# Patient Record
Sex: FEMALE | Race: WHITE | NOT HISPANIC OR LATINO | Employment: OTHER | URBAN - METROPOLITAN AREA
[De-identification: names, ages, dates, MRNs, and addresses within clinical notes are randomized per-mention and may not be internally consistent; named-entity substitution may affect disease eponyms.]

---

## 2021-08-06 ENCOUNTER — PREPPED CHART (OUTPATIENT)
Dept: URBAN - METROPOLITAN AREA CLINIC 19 | Facility: CLINIC | Age: 71
End: 2021-08-06

## 2022-01-14 ASSESSMENT — TONOMETRY
OD_IOP_MMHG: 10
OS_IOP_MMHG: 16

## 2022-01-14 ASSESSMENT — VISUAL ACUITY
OD_SC: 20/25-1
OS_SC: 20/25-1
OU_SC: J1

## 2022-01-18 ENCOUNTER — COMPREHENSIVE EXAM (OUTPATIENT)
Dept: URBAN - METROPOLITAN AREA CLINIC 19 | Facility: CLINIC | Age: 72
End: 2022-01-18

## 2022-01-18 DIAGNOSIS — H43.393: ICD-10-CM

## 2022-01-18 DIAGNOSIS — H04.123: ICD-10-CM

## 2022-01-18 DIAGNOSIS — H25.9: ICD-10-CM

## 2022-01-18 DIAGNOSIS — H35.371: ICD-10-CM

## 2022-01-18 DIAGNOSIS — H40.053: ICD-10-CM

## 2022-01-18 PROCEDURE — 92133 CPTRZD OPH DX IMG PST SGM ON: CPT

## 2022-01-18 PROCEDURE — 92014 COMPRE OPH EXAM EST PT 1/>: CPT

## 2022-01-18 ASSESSMENT — KERATOMETRY
OD_AXISANGLE2_DEGREES: 34
OS_AXISANGLE_DEGREES: 170
OS_K1POWER_DIOPTERS: 44.00
OS_AXISANGLE2_DEGREES: 80
OD_AXISANGLE_DEGREES: 124
OD_K1POWER_DIOPTERS: 43.50
OS_K2POWER_DIOPTERS: 45.00
OD_K2POWER_DIOPTERS: 43.75

## 2022-01-18 ASSESSMENT — TONOMETRY
OD_IOP_MMHG: 23
OS_IOP_MMHG: 21
OS_IOP_MMHG: 20
OD_IOP_MMHG: 18
OD_IOP_MMHG: 22
OS_IOP_MMHG: 20

## 2022-01-18 ASSESSMENT — VISUAL ACUITY
OD_CC: 20/20
OS_SC: 20/25
OD_SC: 20/25-1
OD_OTHER: +2.50 OTC READERS

## 2022-05-18 ENCOUNTER — ESTABLISHED PATIENT (OUTPATIENT)
Dept: URBAN - METROPOLITAN AREA CLINIC 19 | Facility: CLINIC | Age: 72
End: 2022-05-18

## 2022-05-18 DIAGNOSIS — H40.053: ICD-10-CM

## 2022-05-18 PROCEDURE — 99213 OFFICE O/P EST LOW 20 MIN: CPT

## 2022-05-18 PROCEDURE — 92083 EXTENDED VISUAL FIELD XM: CPT

## 2022-05-18 ASSESSMENT — TONOMETRY
OS_IOP_MMHG: 10
OS_IOP_MMHG: 17
OD_IOP_MMHG: 17
OD_IOP_MMHG: 11

## 2022-05-18 ASSESSMENT — VISUAL ACUITY
OS_SC: 20/25-2
OD_SC: 20/40

## 2022-05-18 ASSESSMENT — KERATOMETRY
OS_K1POWER_DIOPTERS: 44.00
OS_AXISANGLE_DEGREES: 170
OD_AXISANGLE2_DEGREES: 34
OS_AXISANGLE2_DEGREES: 80
OD_K1POWER_DIOPTERS: 43.50
OS_K2POWER_DIOPTERS: 45.00
OD_AXISANGLE_DEGREES: 124
OD_K2POWER_DIOPTERS: 43.75

## 2023-01-19 ENCOUNTER — ESTABLISHED PATIENT (OUTPATIENT)
Dept: URBAN - METROPOLITAN AREA CLINIC 19 | Facility: CLINIC | Age: 73
End: 2023-01-19

## 2023-01-19 DIAGNOSIS — H43.393: ICD-10-CM

## 2023-01-19 DIAGNOSIS — H40.053: ICD-10-CM

## 2023-01-19 DIAGNOSIS — H25.813: ICD-10-CM

## 2023-01-19 DIAGNOSIS — H04.123: ICD-10-CM

## 2023-01-19 DIAGNOSIS — H35.371: ICD-10-CM

## 2023-01-19 PROCEDURE — 92133 CPTRZD OPH DX IMG PST SGM ON: CPT

## 2023-01-19 PROCEDURE — 92014 COMPRE OPH EXAM EST PT 1/>: CPT

## 2023-01-19 ASSESSMENT — KERATOMETRY
OS_K1POWER_DIOPTERS: 44.00
OS_AXISANGLE_DEGREES: 170
OD_K1POWER_DIOPTERS: 43.50
OS_AXISANGLE_DEGREES: 077
OS_AXISANGLE2_DEGREES: 80
OS_AXISANGLE2_DEGREES: 167
OD_K2POWER_DIOPTERS: 43.75
OD_AXISANGLE2_DEGREES: 34
OS_K2POWER_DIOPTERS: 44.25
OS_K1POWER_DIOPTERS: 43.75
OD_AXISANGLE_DEGREES: 124
OD_AXISANGLE_DEGREES: 112
OD_AXISANGLE2_DEGREES: 22
OS_K2POWER_DIOPTERS: 45.00

## 2023-01-19 ASSESSMENT — VISUAL ACUITY
OD_SC: 20/40-2
OS_SC: 20/30-1
OU_CC: 20/20

## 2023-01-19 ASSESSMENT — TONOMETRY
OD_IOP_MMHG: 24
OS_IOP_MMHG: 25
OD_IOP_MMHG: 22
OD_IOP_MMHG: 20
OS_IOP_MMHG: 24
OS_IOP_MMHG: 20

## 2023-01-19 NOTE — PATIENT DISCUSSION
due to hi risk for glaucoma, urged to have surgical eval for Cataract and I stent.  trouble getting IOP controlled.

## 2023-04-12 ENCOUNTER — POST-OP (OUTPATIENT)
Dept: URBAN - METROPOLITAN AREA CLINIC 19 | Facility: CLINIC | Age: 73
End: 2023-04-12

## 2023-04-12 DIAGNOSIS — Z96.1: ICD-10-CM

## 2023-04-12 PROCEDURE — 99024 POSTOP FOLLOW-UP VISIT: CPT

## 2023-04-12 ASSESSMENT — TONOMETRY
OD_IOP_MMHG: 18
OS_IOP_MMHG: 19

## 2023-04-12 ASSESSMENT — KERATOMETRY
OD_K1POWER_DIOPTERS: 44.25
OD_AXISANGLE_DEGREES: 4
OD_K2POWER_DIOPTERS: 44.00
OD_AXISANGLE2_DEGREES: 94

## 2023-04-12 ASSESSMENT — VISUAL ACUITY
OS_SC: 20/25+1
OD_SC: 20/30+3
OU_SC: 20/30

## 2023-04-26 ENCOUNTER — POST-OP (OUTPATIENT)
Dept: URBAN - METROPOLITAN AREA CLINIC 19 | Facility: CLINIC | Age: 73
End: 2023-04-26

## 2023-04-26 DIAGNOSIS — Z96.1: ICD-10-CM

## 2023-04-26 PROCEDURE — 99024 POSTOP FOLLOW-UP VISIT: CPT

## 2023-04-26 ASSESSMENT — KERATOMETRY
OD_K2POWER_DIOPTERS: 45.00
OS_K2POWER_DIOPTERS: 45.25
OS_AXISANGLE2_DEGREES: 85
OD_AXISANGLE_DEGREES: 30
OD_K1POWER_DIOPTERS: 44.25
OS_AXISANGLE_DEGREES: 175
OD_AXISANGLE2_DEGREES: 120
OS_K1POWER_DIOPTERS: 44.75

## 2023-04-26 ASSESSMENT — VISUAL ACUITY
OU_SC: 20/25
OD_SC: 20/25-1
OU_SC: J1+
OS_SC: 20/25-1

## 2023-04-26 ASSESSMENT — TONOMETRY
OD_IOP_MMHG: 14
OS_IOP_MMHG: 17

## 2023-11-01 ENCOUNTER — ESTABLISHED PATIENT (OUTPATIENT)
Dept: URBAN - METROPOLITAN AREA CLINIC 19 | Facility: CLINIC | Age: 73
End: 2023-11-01

## 2023-11-01 DIAGNOSIS — H35.371: ICD-10-CM

## 2023-11-01 DIAGNOSIS — H40.053: ICD-10-CM

## 2023-11-01 PROCEDURE — 99213 OFFICE O/P EST LOW 20 MIN: CPT

## 2023-11-01 PROCEDURE — 92083 EXTENDED VISUAL FIELD XM: CPT

## 2023-11-01 PROCEDURE — 92133 CPTRZD OPH DX IMG PST SGM ON: CPT

## 2023-11-01 ASSESSMENT — VISUAL ACUITY
OD_SC: 20/30-2
OU_CC: 20/20
OS_SC: 20/30-2

## 2023-11-01 ASSESSMENT — TONOMETRY
OS_IOP_MMHG: 18
OD_IOP_MMHG: 17

## 2024-06-05 ENCOUNTER — ESTABLISHED PATIENT (OUTPATIENT)
Dept: URBAN - METROPOLITAN AREA CLINIC 19 | Facility: CLINIC | Age: 74
End: 2024-06-05

## 2024-06-05 DIAGNOSIS — H35.371: ICD-10-CM

## 2024-06-05 DIAGNOSIS — H04.123: ICD-10-CM

## 2024-06-05 DIAGNOSIS — H43.393: ICD-10-CM

## 2024-06-05 DIAGNOSIS — H40.053: ICD-10-CM

## 2024-06-05 PROCEDURE — 92014 COMPRE OPH EXAM EST PT 1/>: CPT

## 2024-06-05 PROCEDURE — 92250 FUNDUS PHOTOGRAPHY W/I&R: CPT

## 2024-06-05 ASSESSMENT — KERATOMETRY
OS_K1POWER_DIOPTERS: 44.5
OD_AXISANGLE_DEGREES: 170
OD_K1POWER_DIOPTERS: 44
OD_K2POWER_DIOPTERS: 43.5
OS_K2POWER_DIOPTERS: 44
OD_AXISANGLE2_DEGREES: 80
OS_AXISANGLE_DEGREES: 167
OS_AXISANGLE2_DEGREES: 77

## 2024-06-05 ASSESSMENT — TONOMETRY
OD_IOP_MMHG: 18
OS_IOP_MMHG: 17

## 2024-06-05 ASSESSMENT — VISUAL ACUITY
OD_SC: 20/25-2
OS_SC: 20/30+2
OU_CC: 20/20

## 2024-12-10 ENCOUNTER — FOLLOW UP (OUTPATIENT)
Age: 74
End: 2024-12-10

## 2024-12-10 DIAGNOSIS — H40.053: ICD-10-CM

## 2024-12-10 PROCEDURE — 99212 OFFICE O/P EST SF 10 MIN: CPT

## 2024-12-10 PROCEDURE — 92083 EXTENDED VISUAL FIELD XM: CPT

## 2024-12-10 PROCEDURE — 92133 CPTRZD OPH DX IMG PST SGM ON: CPT

## 2025-06-11 ENCOUNTER — COMPREHENSIVE EXAM (OUTPATIENT)
Age: 75
End: 2025-06-11

## 2025-06-11 DIAGNOSIS — H35.371: ICD-10-CM

## 2025-06-11 DIAGNOSIS — H40.053: ICD-10-CM

## 2025-06-11 DIAGNOSIS — H04.123: ICD-10-CM

## 2025-06-11 DIAGNOSIS — H43.393: ICD-10-CM

## 2025-06-11 PROCEDURE — 92014 COMPRE OPH EXAM EST PT 1/>: CPT

## 2025-06-11 PROCEDURE — 92250 FUNDUS PHOTOGRAPHY W/I&R: CPT
